# Patient Record
Sex: MALE | Race: WHITE | HISPANIC OR LATINO | Employment: OTHER | URBAN - METROPOLITAN AREA
[De-identification: names, ages, dates, MRNs, and addresses within clinical notes are randomized per-mention and may not be internally consistent; named-entity substitution may affect disease eponyms.]

---

## 2024-02-06 ENCOUNTER — HOSPITAL ENCOUNTER (EMERGENCY)
Facility: HOSPITAL | Age: 74
Discharge: HOME | End: 2024-02-06

## 2024-02-06 ENCOUNTER — APPOINTMENT (OUTPATIENT)
Dept: RADIOLOGY | Facility: HOSPITAL | Age: 74
End: 2024-02-06

## 2024-02-06 VITALS
BODY MASS INDEX: 26.63 KG/M2 | OXYGEN SATURATION: 99 % | WEIGHT: 186 LBS | RESPIRATION RATE: 20 BRPM | HEIGHT: 70 IN | TEMPERATURE: 98.1 F | HEART RATE: 71 BPM

## 2024-02-06 DIAGNOSIS — S50.852A FOREIGN BODY IN LEFT FOREARM, INITIAL ENCOUNTER: Primary | ICD-10-CM

## 2024-02-06 PROCEDURE — 99284 EMERGENCY DEPT VISIT MOD MDM: CPT | Mod: 25

## 2024-02-06 PROCEDURE — 73090 X-RAY EXAM OF FOREARM: CPT | Mod: LEFT SIDE | Performed by: RADIOLOGY

## 2024-02-06 PROCEDURE — 90471 IMMUNIZATION ADMIN: CPT | Performed by: NURSE PRACTITIONER

## 2024-02-06 PROCEDURE — 99283 EMERGENCY DEPT VISIT LOW MDM: CPT | Mod: 25 | Performed by: NURSE PRACTITIONER

## 2024-02-06 PROCEDURE — 2500000004 HC RX 250 GENERAL PHARMACY W/ HCPCS (ALT 636 FOR OP/ED): Performed by: NURSE PRACTITIONER

## 2024-02-06 PROCEDURE — 90715 TDAP VACCINE 7 YRS/> IM: CPT | Performed by: NURSE PRACTITIONER

## 2024-02-06 PROCEDURE — 73090 X-RAY EXAM OF FOREARM: CPT | Mod: LT

## 2024-02-06 RX ORDER — SULFAMETHOXAZOLE AND TRIMETHOPRIM 800; 160 MG/1; MG/1
1 TABLET ORAL 2 TIMES DAILY
Qty: 20 TABLET | Refills: 0 | Status: SHIPPED | OUTPATIENT
Start: 2024-02-06 | End: 2024-02-16

## 2024-02-06 RX ORDER — CEPHALEXIN 500 MG/1
500 CAPSULE ORAL 4 TIMES DAILY
Qty: 40 CAPSULE | Refills: 0 | Status: SHIPPED | OUTPATIENT
Start: 2024-02-06 | End: 2024-02-16

## 2024-02-06 RX ADMIN — TETANUS TOXOID, REDUCED DIPHTHERIA TOXOID AND ACELLULAR PERTUSSIS VACCINE, ADSORBED 0.5 ML: 5; 2.5; 8; 8; 2.5 SUSPENSION INTRAMUSCULAR at 13:30

## 2024-02-06 ASSESSMENT — COLUMBIA-SUICIDE SEVERITY RATING SCALE - C-SSRS
6. HAVE YOU EVER DONE ANYTHING, STARTED TO DO ANYTHING, OR PREPARED TO DO ANYTHING TO END YOUR LIFE?: NO
1. IN THE PAST MONTH, HAVE YOU WISHED YOU WERE DEAD OR WISHED YOU COULD GO TO SLEEP AND NOT WAKE UP?: NO
2. HAVE YOU ACTUALLY HAD ANY THOUGHTS OF KILLING YOURSELF?: NO

## 2024-02-06 ASSESSMENT — PAIN - FUNCTIONAL ASSESSMENT
PAIN_FUNCTIONAL_ASSESSMENT: 0-10
PAIN_FUNCTIONAL_ASSESSMENT: 0-10

## 2024-02-06 ASSESSMENT — PAIN SCALES - GENERAL
PAINLEVEL_OUTOF10: 9
PAINLEVEL_OUTOF10: 9

## 2024-02-06 ASSESSMENT — PAIN DESCRIPTION - PAIN TYPE: TYPE: ACUTE PAIN

## 2024-02-06 NOTE — ED PROVIDER NOTES
HPI   Chief Complaint   Patient presents with    Wound Infection     Left forearm        Ru is a 73 year old man who presents with a wound on his left forearm he has had for 2 weeks. He was walking in a garage with the lights off,  tripped on something on the floor and struck his arm. He states he had a foreign object protruding out of his arm, his wife pulled it out with tweezers but believes a piece was left behind. He could not say what the foreign object was made of, but said it was possible it was wood. The wound is currently very irritated and bothersome. He denies any fever, chills, nausea, vomiting, drainage from the wound, loss of function of the hand or arm, or streaking up the arm.      History provided by:  Patient   used: No                        [place] Coma Scale Score: 15                  Patient History   Past Medical History:   Diagnosis Date    Acute upper respiratory infection, unspecified 02/03/2020    Viral URI with cough    Epilepsy, unspecified, not intractable, without status epilepticus (CMS/[facility]ly [facility]) 06/01/2015    Epilepsy    Epilepsy, unspecified, not intractable, without status epilepticus (CMS/[facility]) 11/06/2020    Epilepsy     Past Surgical History:   Procedure Laterality Date    OTHER SURGICAL HISTORY  06/01/2015    Removal Of Skull Prosthetic Plate    OTHER SURGICAL HISTORY  11/05/2020    Appendectomy    OTHER SURGICAL HISTORY  11/05/2020    Hand surgery     No family history on file.  Social History     Tobacco Use    Smoking status: Not on file    Smokeless tobacco: Not on file   Substance Use Topics    Alcohol use: Not on file    Drug use: Not on file       Physical Exam   ED Triage Vitals [02/06/24 1013]   Temperature Heart Rate Respirations BP   36.7 °C (98.1 °F) 71 20 --      Pulse Ox Temp src Heart Rate Source Patient Position   99 % -- -- --      BP Location FiO2 (%)     -- --       Physical Exam  Vitals and nursing note reviewed.   Constitutional:        Appearance: Normal appearance. He is normal weight.   HENT:      Head: Normocephalic.      Mouth/Throat:      Mouth: Mucous membranes are moist.   Eyes:      Extraocular Movements: Extraocular movements intact.      Conjunctiva/sclera: Conjunctivae normal.   Cardiovascular:      Rate and Rhythm: Normal rate.      Pulses: Normal pulses.      Heart sounds: Normal heart sounds.   Pulmonary:      Effort: Pulmonary effort is normal.      Breath sounds: Normal breath sounds.   Musculoskeletal:      Cervical back: Normal range of motion and neck supple.   Skin:     General: Skin is warm.      Capillary Refill: Capillary refill takes less than 2 seconds.      Findings: Signs of injury and wound present.      Comments: Wound is about 2 cm with red, hot, indurated skin surrounding.  no active drainage, starting to scab.   Neurological:      General: No focal deficit present.      Mental Status: He is alert.   Psychiatric:         Mood and Affect: Mood normal.         ED Course & MDM   Diagnoses as of 02/06/24 1303   Foreign body in left forearm, initial encounter       Medical Decision Making  Patient was seen and examined in the ED, with the nurse practitioner Roshni Grullon. The patient was well appearing on initial evaluation. He has a 3 cm wound surrounded by red, hot, indurated skin. There was no active drainage, the wound is starting to scab. He has had the wound for 2 weeks. The patient's tetanus was updated with a Tdap vaccine.  An x-ray of the left forearm was obtained and showed a metallic foreign body measuring 2 mm within the volar soft tissues along the ulnar side of the mid forearm approximate 1.5 cm deep to the skin surface.  We were unable to determine exactly how deep on x-ray so we performed a bedside ultrasound.  We were unable to tentatively locate the foreign body.  We do not feel that incision and removal was warranted at this point.  The patient was educated on wound care.  He was provided prescriptions  for Bactrim and Keflex from administration.  He is to follow-up with his primary care physician next 2 to 3 days.  The patient is to return if he has any worsening symptomatology.  The patient was discharged in stable condition with computer discharge instructions given.  Patient was agreeable with discharge planning.      The physician assistant was personally supervised by me, Roshni Grullon CNP, during the physical examination.  I personally performed a physical exam and medical decision making.  I have made appropriate changes to the documentation and assessment and plan based on my verification, exam, and medical decision making.    Amount and/or Complexity of Data Reviewed  Radiology: ordered. Decision-making details documented in ED Course.        Procedure  Procedures     NENA Bullard  02/06/24 1305       NENA Bullard  02/06/24 1313